# Patient Record
Sex: MALE | URBAN - METROPOLITAN AREA
[De-identification: names, ages, dates, MRNs, and addresses within clinical notes are randomized per-mention and may not be internally consistent; named-entity substitution may affect disease eponyms.]

---

## 2020-11-19 ENCOUNTER — APPOINTMENT (RX ONLY)
Dept: URBAN - METROPOLITAN AREA OTHER 8 | Facility: OTHER | Age: 65
Setting detail: DERMATOLOGY
End: 2020-11-19

## 2020-11-19 DIAGNOSIS — I83.81 VARICOSE VEINS OF LOWER EXTREMITIES WITH PAIN: ICD-10-CM

## 2020-11-19 PROBLEM — I83.811 VARICOSE VEINS OF RIGHT LOWER EXTREMITY WITH PAIN: Status: ACTIVE | Noted: 2020-11-19

## 2020-11-19 PROCEDURE — 99212 OFFICE O/P EST SF 10 MIN: CPT

## 2020-11-19 PROCEDURE — 93971 EXTREMITY STUDY: CPT

## 2020-11-19 PROCEDURE — ? DOPPLER US

## 2020-11-19 ASSESSMENT — LOCATION DETAILED DESCRIPTION DERM: LOCATION DETAILED: RIGHT PROXIMAL PRETIBIAL REGION

## 2020-11-19 ASSESSMENT — LOCATION SIMPLE DESCRIPTION DERM: LOCATION SIMPLE: RIGHT PRETIBIAL REGION

## 2020-11-19 ASSESSMENT — LOCATION ZONE DERM: LOCATION ZONE: LEG

## 2020-11-19 NOTE — PROCEDURE: DOPPLER US
Left Distal Gsv Reflux (Sec): no reflux
Use Ssv Or Lsv: SSV
Detail Level: Simple
Ultrasound Used (Optional): Terason 3000

## 2021-08-27 ENCOUNTER — OFFICE VISIT (OUTPATIENT)
Dept: URBAN - METROPOLITAN AREA CLINIC 78 | Facility: CLINIC | Age: 66
End: 2021-08-27
Payer: COMMERCIAL

## 2021-08-27 VITALS
SYSTOLIC BLOOD PRESSURE: 118 MMHG | WEIGHT: 309.8 LBS | HEIGHT: 77 IN | BODY MASS INDEX: 36.58 KG/M2 | TEMPERATURE: 97.2 F | HEART RATE: 57 BPM | DIASTOLIC BLOOD PRESSURE: 75 MMHG

## 2021-08-27 DIAGNOSIS — Z86.010 PERSONAL HISTORY OF COLONIC POLYPS: ICD-10-CM

## 2021-08-27 DIAGNOSIS — E11.9 DIABETES: ICD-10-CM

## 2021-08-27 DIAGNOSIS — K22.70 BARRETT ESOPHAGUS: ICD-10-CM

## 2021-08-27 DIAGNOSIS — F45.8 GLOBUS SENSATION: ICD-10-CM

## 2021-08-27 PROCEDURE — 99214 OFFICE O/P EST MOD 30 MIN: CPT | Performed by: INTERNAL MEDICINE

## 2021-08-27 RX ORDER — ASPIRIN 81 MG/1
TABLET, COATED ORAL
Qty: 0 | Refills: 0 | Status: ACTIVE | COMMUNITY
Start: 1900-01-01

## 2021-08-27 RX ORDER — ATORVASTATIN CALCIUM 40 MG/1
TAKE 1 TABLET (40 MG) BY ORAL ROUTE ONCE DAILY TABLET, FILM COATED ORAL 1
Qty: 0 | Refills: 0 | Status: ACTIVE | COMMUNITY
Start: 1900-01-01

## 2021-08-27 RX ORDER — MELOXICAM 15 MG/1
TAKE 1 TABLET (15 MG) BY ORAL ROUTE ONCE DAILY TABLET ORAL 1
Qty: 0 | Refills: 0 | Status: ACTIVE | COMMUNITY
Start: 1900-01-01

## 2021-08-27 RX ORDER — LISINOPRIL 2.5 MG/1
TAKE 1 TABLET (2.5 MG) BY ORAL ROUTE ONCE DAILY TABLET ORAL 1
Qty: 0 | Refills: 0 | Status: ACTIVE | COMMUNITY
Start: 1900-01-01

## 2021-08-27 RX ORDER — PROPAFENONE HYDROCHLORIDE 300 MG/1
TAKE 1 TABLET (300 MG) BY ORAL ROUTE EVERY 8 HOURS TABLET, COATED ORAL
Qty: 0 | Refills: 0 | Status: ACTIVE | COMMUNITY
Start: 1900-01-01

## 2021-08-27 RX ORDER — SPIRONOLACTONE 25 MG/1
TAKE 1 TABLET (25 MG) BY ORAL ROUTE EVERY OTHER DAY TABLET ORAL
Qty: 0 | Refills: 0 | Status: ACTIVE | COMMUNITY
Start: 1900-01-01

## 2021-08-27 RX ORDER — DAPAGLIFLOZIN 10 MG/1
TAKE 1 TABLET (10 MG) BY ORAL ROUTE ONCE DAILY IN THE MORNING TABLET, FILM COATED ORAL 1
Qty: 0 | Refills: 0 | Status: ACTIVE | COMMUNITY
Start: 1900-01-01

## 2021-08-27 RX ORDER — PANTOPRAZOLE SODIUM 40 MG
TAKE 1 TABLET (40 MG) BY ORAL ROUTE TWICE DAILY 1/2 HOUR BEFORE BREAKFAST AND 1/2 HOUR BEFORE DINNER TABLET, DELAYED RELEASE (ENTERIC COATED) ORAL 1
Qty: 60 | Refills: 2 | Status: ACTIVE | COMMUNITY
Start: 2019-07-23

## 2021-08-27 RX ORDER — PANTOPRAZOLE SODIUM 40 MG/1
TAKE 1 TABLET (40 MG) BY ORAL ROUTE ONCE DAILY TABLET, DELAYED RELEASE ORAL 1
Qty: 0 | Refills: 0 | Status: ACTIVE | COMMUNITY
Start: 1900-01-01

## 2021-08-27 RX ORDER — GABAPENTIN 300 MG/1
TAKE 1 CAPSULE (300 MG) BY ORAL ROUTE 3 TIMES PER DAY CAPSULE ORAL
Qty: 0 | Refills: 0 | Status: ACTIVE | COMMUNITY
Start: 1900-01-01

## 2021-08-27 NOTE — PHYSICAL EXAM NECK/THYROID:
Pre-Surgery Instructions:   Medication Instructions    acetaminophen (TYLENOL) 325 mg tablet Instructed patient per Anesthesia Guidelines   albuterol (2 5 mg/3 mL) 0 083 % nebulizer solution Instructed patient per Anesthesia Guidelines   albuterol (PROVENTIL HFA,VENTOLIN HFA) 90 mcg/act inhaler Instructed patient per Anesthesia Guidelines   amLODIPine (NORVASC) 5 mg tablet Instructed patient per Anesthesia Guidelines   atorvastatin (LIPITOR) 20 mg tablet Instructed patient per Anesthesia Guidelines   candesartan (ATACAND) 32 MG tablet Instructed patient per Anesthesia Guidelines   glucosamine-chondroitin 500-400 MG tablet Instructed patient per Anesthesia Guidelines   hydrochlorothiazide (HYDRODIURIL) 25 mg tablet Instructed patient per Anesthesia Guidelines   hydrocortisone (ANUSOL-HC) 2 5 % rectal cream Instructed patient per Anesthesia Guidelines   ibandronate (BONIVA) 150 MG tablet Instructed patient per Anesthesia Guidelines   MAGNESIUM PO Instructed patient per Anesthesia Guidelines   meclizine (ANTIVERT) 12 5 MG tablet Instructed patient per Anesthesia Guidelines   metFORMIN (GLUCOPHAGE) 500 mg tablet Instructed patient per Anesthesia Guidelines   mometasone-formoterol (DULERA) 200-5 MCG/ACT inhaler Instructed patient per Anesthesia Guidelines   VITAMIN E PO Instructed patient per Anesthesia Guidelines  Spoke to pt  Medication list reviewed & instructed   As of 9/16 pt to stop vit E, magnesium, and glucosamine  Instructed on tylenol only  Am DOS pt ok to take amlodipine with small sip of water  Pt to use dulera inhaler prior to arrival    Showering instructions provided by surgeon office, reviewed @ time of call   Pt states she does not have bowel prep instructions, will contact surgeon office to notify  Pt confirmed she has pre op abx prescribed by surgeon office     Reviewed 1 visitor / patient policy   Advised no alcohol 24 hour prior   Negative COVID normal appearance , without tenderness upon palpation , no deformities , trachea midline , Thyroid normal size , no thyroid nodules , no masses , no JVD , thyroid nontender screening, pending  All instructions verbally understood by patient  All questions answered  Callback number provided       ACE/ARB Med Class     Continue this medication up to the evening before surgery/procedure, but do not take the morning of the day of surgery  Diuretic Med Class     Continue this medication up to the evening before surgery/procedure, but do not take the morning of the day of surgery  Acetaminophen Med Class     Continue to take this medication on your normal schedule  If this is an oral medication and you take it in the morning, then you may take this medicine with a sip of water  Calcium Channel Blocker Med Class     Continue to take this heart medication on your normal schedule  If this is an oral medication and you take it in the morning, then you may take this medicine with a sip of water  Herbal Med Class     Stop taking this herbal medications at least one week prior to surgery/procedure  Inhalational Med Class     Continue to take these inhaler medications on your normal schedule up to and including the day of surgery  Insulin Med Class     Pre-Surgery/Procedure Instructions for Adult Patients who Take Medicine for Diabetes or to Control their Blood Sugar     Day Before Surgery/Procedure  Use the directions based on the type of medicine you take for your diabetes  1  If you are having a procedure that does not require a bowel prep:  ? Pre-Mixed Insulin (Intermediate Acting: Humalog 75/25, Humulin 70/30  Novolog 70/30, Regular Insulin)  § Take ½ your regular dose the evening before your procedure  ? Rapid/Fast Acting Insulin/Long Acting Insulin (Humalog U200, NovoLog, Apidra, Lantus, Levemir, Maxim Lyons, [place])  § Take your FULL regular dose the day before procedure  ? Oral Diabetic Medicines including Glipizide/Glimepiride/Glucotrol (sulfonylurea)  § Take your regular dose with dinner the evening before your procedure    2  If you are having a procedure (e g  Colonoscopy) that requires a bowel prep and you are allowed to have at least a clear liquid diet:  ? Pre-Mixed Insulin (Intermediate Acting: Humalog 75/25, Humulin 70/30, Novolog 70/30, Regular Insulin)  § Take ½ your regular dose the evening before your procedure  ? Rapid/Fast Acting Insulin (Humalog U200, NovoLog, Apidra, Fiasp)  § Take ½ your regular dose the evening before your procedure  ? Long Acting Insulin (Lantus, Levemir, [place])  § Take your FULL regular dose the day before procedure  ? Oral Glipizide/Glimepiride/Glucotrol (sulfonylurea)  § Take ½ your regular dose the evening before your procedure  ? Oral Diabetic Medicines that are NOT Glipizide/Glimepiride/Glucotrol  § Take your regular dose with dinner in the evening before your procedure      Day of Surgery/Procedure  · Long Acting Insulin (Lantus, Levemir, [place])  ? If you usually take your Long-Acting Insulin in the morning, take the full dose as scheduled  · With the exception of the morning Long-Acting Insulin noted above, DO NOT take ANY diabetic medicine on the day of your procedure unless you were instructed by the doctor who manages your diabetic medicines  · Continue to check your blood sugars  · If you have an insulin pump then consult with your Endocrinologist for instructions  · If you cannot see your Endocrinologist, on the day of the procedure set your insulin pump to your basal rate only  Please bring your insulin pump supplies to the hospital      This Educational material has been approved by the Patient Education Advisory Committee  Date prepared: 1/17/2018          Expiration date: 1/17/2019        Approval Number:                     Opioid Med Class     Continue to take this medication on your normal schedule  If this is an oral medication and you take it in the morning, then you may take this medicine with a sip of water  Statin Med Class     Continue to take this medication on your normal schedule    If this is an oral medication and you take it in the morning, then you may take this medicine with a sip of water  Vitamin Med Class     You may continue to take any vitamin that your surgeon has prescribed to you up to the day before surgery  If your surgeon has not specifically prescribed this vitamin or instructed you to continue then stop taking 7 days prior to surgery

## 2021-08-27 NOTE — HPI-TODAY'S VISIT:
I have followed the patient on referral from Elmer Juares DO , for a gastroenterology evaluation for abdominal pain and to discuss results of recent extensive workup.  A copy of this note will be sent to the referring physician.    The patient had initially seen me complaining that since 7/5 he has been noticing a new onset pain localized to the upper epigastric region, constant, gnawing, 9/10 in intenstiy.This pain has since resolved.  He has been noticing some new onste dysphagia to solid along the upper esophagus. He continues to have a globus sensation and the need to clear his throat. He denies much in the way of PND. He has also noted a non-specific epigastric discomfort. He has a history fo BE and has been compliant with Pantoprazole QD. He feels his heartburn is well controlled.  He has been having 1-2 soft formed BM's daily. He has not had any further abdominal pain. No blood in the stools. Appetite has been good. His weight has increased mildly lately.  He has a prior history of pancreatitis diagnosed 7 years ago, felt to be drug induced (from one of his diabetes meds). He was seen at the ER on 7/9/19 where he was told he had pancreatitis, despite having normal lipase levels. A subsequent MRI showed some non-pecific nodularity at the head/junction. An EUS was negative for pancreatic mass.  He denies tobacco or alcohol use. He has been limiting his use of Meloxicam for back pain. His DM is well controlled.   He recently underwent R knee replacement and had been on narcotics for pain control which triggered mild constipation.   He has A fib, on [facility], followed by Dr. Ocampo.   There is no FH of GI malignancies.   Summary of prior workup: - Labs on 10/8/19 revealed a CA 19-9 of 29 ( normal) and an IgG4 of 12.8. - EUS by Dr. Loomis on 10/8/19 showed diffuse echogenicity throughout the pancreas consistent with fatty infiltration/pancreatic steatosis. No PD dilation or suspicious pancreatic lesions. No pancreas divisum. Normal ampulla. Normal common bile duct. A repeat MRI of the pancreas was recommended in 3-6 months. - MRI of the abdomen on 7/29/19 showed subtle increased T2 signal surrounding the pancreas suggestive of mild acute inflammation. Significant glandular pancreatic atrophy suggestive of chronic pancreatitis.. Nodular appearance of the head & junction likely reflecting residual pancreatic issue with mass felt less likely. - EGD and colonoscopy by me on 8/20/19 revealed Barretts esoph extending from 42-45cm, biopsies were negative for dysplasia. There was mild antral erythema, and a normal duodenum. Biopsies were negative for H pylori or celiac sprue. On colonoscopy there was an 8 mm submucosal lipoma in the cecum, 7 mm intramucosal lymphoid aggregate in the HF and a diminutive benign rectal polyp. The terminal ileum was normal. Nonbleeding internal hemorrhoids were noted on retroflexion. - GI PCR panel on 7/24/19 was negative. - Labs on 7/12/19 showed a Gluc 98, BUN 16, Cr 0.87, normal electrolytes. TP 7.1, Alb 4.1, AP 89, TB 0.8, AST 12, ALT 12. Amylase 19, lipase 28. TSH 1.92. Free T4 1.0. WBC 6.3, Hb 13.1 (normal values 13.2-17.1), MCV 87, plts 253. PSA 0.2. HbA1c 7.2%. - CT/A/P with iv contrast showed edema about the uncinate process of the pancreas, normal body and tail. Isolated enlarger R inguinal LN. Fatty pancreas. Liver, spleen and adrenal glands normal. Normal enhancing kidneys. No inflammatory changes noted involving the bowel. No retroperitoneal mass.  - E/C in 2011 showed BE and a precancerous colon polyp.   - EGD by Dr. Reginald Alvarado on 7/6/09 showed a long segment non-dysplastic Barretts extending from 40 to 45 cm, normal stomach and duodenum.

## 2021-10-27 ENCOUNTER — OFFICE VISIT (OUTPATIENT)
Dept: URBAN - METROPOLITAN AREA SURGERY CENTER 15 | Facility: SURGERY CENTER | Age: 66
End: 2021-10-27
Payer: COMMERCIAL

## 2021-10-27 DIAGNOSIS — K31.7 BENIGN GASTRIC POLYP: ICD-10-CM

## 2021-10-27 DIAGNOSIS — K22.70 BARRETT ESOPHAGUS: ICD-10-CM

## 2021-10-27 PROCEDURE — G8907 PT DOC NO EVENTS ON DISCHARG: HCPCS | Performed by: INTERNAL MEDICINE

## 2021-10-27 PROCEDURE — 43239 EGD BIOPSY SINGLE/MULTIPLE: CPT | Performed by: INTERNAL MEDICINE

## 2022-01-12 ENCOUNTER — WEB ENCOUNTER (OUTPATIENT)
Dept: URBAN - METROPOLITAN AREA CLINIC 78 | Facility: CLINIC | Age: 67
End: 2022-01-12

## 2022-01-18 ENCOUNTER — OFFICE VISIT (OUTPATIENT)
Dept: URBAN - METROPOLITAN AREA CLINIC 78 | Facility: CLINIC | Age: 67
End: 2022-01-18
Payer: COMMERCIAL

## 2022-01-18 VITALS
TEMPERATURE: 97.4 F | HEART RATE: 96 BPM | BODY MASS INDEX: 37.19 KG/M2 | WEIGHT: 315 LBS | SYSTOLIC BLOOD PRESSURE: 146 MMHG | HEIGHT: 77 IN | DIASTOLIC BLOOD PRESSURE: 85 MMHG

## 2022-01-18 DIAGNOSIS — R13.10 DYSPHAGIA: ICD-10-CM

## 2022-01-18 DIAGNOSIS — K22.70 BARRETT ESOPHAGUS: ICD-10-CM

## 2022-01-18 DIAGNOSIS — R10.13 EPIGASTRIC PAIN: ICD-10-CM

## 2022-01-18 DIAGNOSIS — I48.91 UNSPECIFIED ATRIAL FIBRILLATION: ICD-10-CM

## 2022-01-18 DIAGNOSIS — E11.9 DIABETES: ICD-10-CM

## 2022-01-18 DIAGNOSIS — F45.8 GLOBUS SENSATION: ICD-10-CM

## 2022-01-18 DIAGNOSIS — R16.0 HEPATOMEGALY: ICD-10-CM

## 2022-01-18 DIAGNOSIS — R19.7 DIARRHEA, UNSPECIFIED TYPE: ICD-10-CM

## 2022-01-18 DIAGNOSIS — K86.89 FATTY PANCREAS: ICD-10-CM

## 2022-01-18 DIAGNOSIS — R11.2 NAUSEA WITH VOMITING, UNSPECIFIED: ICD-10-CM

## 2022-01-18 PROBLEM — 49436004 ATRIAL FIBRILLATION: Status: ACTIVE | Noted: 2021-08-27

## 2022-01-18 PROBLEM — 302914006 BARRETT ESOPHAGUS: Status: ACTIVE | Noted: 2021-08-27

## 2022-01-18 PROBLEM — 428283002: Status: ACTIVE | Noted: 2021-08-27

## 2022-01-18 PROBLEM — 267103008 GLOBUS SENSATION: Status: ACTIVE | Noted: 2021-08-27

## 2022-01-18 PROBLEM — 40739000 DYSPHAGIA: Status: ACTIVE | Noted: 2021-08-27

## 2022-01-18 PROCEDURE — 99214 OFFICE O/P EST MOD 30 MIN: CPT | Performed by: INTERNAL MEDICINE

## 2022-01-18 RX ORDER — DAPAGLIFLOZIN 10 MG/1
TAKE 1 TABLET (10 MG) BY ORAL ROUTE ONCE DAILY IN THE MORNING TABLET, FILM COATED ORAL 1
Qty: 0 | Refills: 0 | Status: ON HOLD | COMMUNITY
Start: 1900-01-01

## 2022-01-18 RX ORDER — INSULIN DEGLUDEC INJECTION 100 U/ML
AS DIRECTED INJECTION, SOLUTION SUBCUTANEOUS
Status: ACTIVE | COMMUNITY

## 2022-01-18 RX ORDER — ATORVASTATIN CALCIUM 40 MG/1
TAKE 1 TABLET (40 MG) BY ORAL ROUTE ONCE DAILY TABLET, FILM COATED ORAL 1
Qty: 0 | Refills: 0 | Status: ACTIVE | COMMUNITY
Start: 1900-01-01

## 2022-01-18 RX ORDER — LISINOPRIL 2.5 MG/1
TAKE 1 TABLET (2.5 MG) BY ORAL ROUTE ONCE DAILY TABLET ORAL 1
Qty: 0 | Refills: 0 | Status: ACTIVE | COMMUNITY
Start: 1900-01-01

## 2022-01-18 RX ORDER — PROPAFENONE HYDROCHLORIDE 300 MG/1
TAKE 1 TABLET (300 MG) BY ORAL ROUTE EVERY 8 HOURS TABLET, COATED ORAL
Qty: 0 | Refills: 0 | Status: ON HOLD | COMMUNITY
Start: 1900-01-01

## 2022-01-18 RX ORDER — PANTOPRAZOLE SODIUM 40 MG
TAKE 1 TABLET (40 MG) BY ORAL ROUTE TWICE DAILY 1/2 HOUR BEFORE BREAKFAST AND 1/2 HOUR BEFORE DINNER TABLET, DELAYED RELEASE (ENTERIC COATED) ORAL 1
Qty: 60 | Refills: 2 | Status: ON HOLD | COMMUNITY
Start: 2019-07-23

## 2022-01-18 RX ORDER — MELOXICAM 15 MG/1
TAKE 1 TABLET (15 MG) BY ORAL ROUTE ONCE DAILY TABLET ORAL 1
Qty: 0 | Refills: 0 | Status: ON HOLD | COMMUNITY
Start: 1900-01-01

## 2022-01-18 RX ORDER — INSULIN ASPART 100 [IU]/ML
AS DIRECTED INJECTION, SOLUTION INTRAVENOUS; SUBCUTANEOUS
Status: ACTIVE | COMMUNITY

## 2022-01-18 RX ORDER — ASPIRIN 81 MG/1
TABLET, COATED ORAL
Qty: 0 | Refills: 0 | Status: ON HOLD | COMMUNITY
Start: 1900-01-01

## 2022-01-18 RX ORDER — PIOGLITAZONE 30 MG/1
1 TABLET TABLET ORAL ONCE A DAY
Status: ACTIVE | COMMUNITY

## 2022-01-18 RX ORDER — EMPAGLIFLOZIN 25 MG/1
1 TABLET TABLET, FILM COATED ORAL ONCE A DAY
Status: ACTIVE | COMMUNITY

## 2022-01-18 RX ORDER — PANTOPRAZOLE SODIUM 40 MG/1
TAKE 1 TABLET (40 MG) BY ORAL ROUTE ONCE DAILY TABLET, DELAYED RELEASE ORAL 1
Qty: 0 | Refills: 0 | Status: ACTIVE | COMMUNITY
Start: 1900-01-01

## 2022-01-18 RX ORDER — TRAVOPROST OPHTHALMIC SOLUTION, 0.004% 0.04 MG/ML
1 DROP INTO AFFECTED EYE IN THE EVENING SOLUTION/ DROPS OPHTHALMIC ONCE A DAY
Status: ACTIVE | COMMUNITY

## 2022-01-18 RX ORDER — SPIRONOLACTONE 25 MG/1
TAKE 1 TABLET (25 MG) BY ORAL ROUTE EVERY OTHER DAY TABLET ORAL
Qty: 0 | Refills: 0 | Status: ON HOLD | COMMUNITY
Start: 1900-01-01

## 2022-01-18 RX ORDER — FEXOFENADINE HYDROCHLORIDE AND PSEUDOEPHEDRINE HYDROCHLORIDE 180; 240 MG/1; MG/1
1 TABLET TABLET, FILM COATED, EXTENDED RELEASE ORAL ONCE A DAY
Qty: 30 | Refills: 2 | OUTPATIENT
Start: 2022-01-18 | End: 2022-04-18

## 2022-01-18 RX ORDER — ONDANSETRON HYDROCHLORIDE 8 MG/1
1 TABLET TABLET, FILM COATED ORAL
Qty: 40 | Refills: 1 | OUTPATIENT
Start: 2022-01-18

## 2022-01-18 RX ORDER — GABAPENTIN 300 MG/1
TAKE 1 CAPSULE (300 MG) BY ORAL ROUTE 3 TIMES PER DAY CAPSULE ORAL
Qty: 0 | Refills: 0 | Status: ACTIVE | COMMUNITY
Start: 1900-01-01

## 2022-01-18 RX ORDER — SUCRALFATE 1 G/1
1 TABLET TABLET ORAL
Qty: 60 | Refills: 2 | OUTPATIENT
Start: 2022-01-18 | End: 2022-04-18

## 2022-01-18 NOTE — HPI-TODAY'S VISIT:
I have followed the patient on referral from Elmer Juares DO. A copy of this note will be sent to the referring physician.    He has a history fo BE and has been compliant with Pantoprazole. He feels his heartburn is well controlled. He continues to have a globus sensation and the need to clear his throat. He denies much in the way of PND.  He has a prior history of pancreatitis diagnosed 7 years ago, felt to be drug induced (from one of his diabetes meds). He was seen at the ER on 7/9/19 where he was told he had pancreatitis, despite having normal lipase levels. A subsequent MRI showed some non-pecific nodularity at the head/junction. An EUS was negative for pancreatic mass.  *Since his last visit he was diagnosed with COVID pneumonia and was hospitalized, at which time he received Remdesvir and steroids. He still has coughing spells and followed up with Pulm on 1/14. A repeat CT chest was ordered to monitor his pneumonia.   He has also had multiple GI complaints. He reports some nausea and vomting. He has had some diarhrea. He describes having 2 BM's daily- sometimes runny, other times semi-soft. They have been a dark brown color.  he has not felt much of an appetite. He has had an increased burning sensation postprandially in the epigastric region which lasts hours.   He has been compliant with using Pantoprazole BID. Today we reviewed the resutls of his recent EGD and path.   He denies tobacco or alcohol use. He has been limiting his use of Meloxicam for back pain. His DM is well controlled.   He has A fib, on Eliquis, followed by Dr. Ocampo.   There is no FH of GI malignancies.   Summary of prior workup: - CT abdomen and pelvis with contrast on 1/7/2022: Patchy interstitial opacities in the posterior lung bases.  Differential includes developing pneumonia versus segmental atelectasis.  Hepatomegaly.  Fatty infiltration of the pancreas. - EGD by me on 10/27/21: Normal esoph (distal esoph bxs neg for reflux), C2M4 (40-44 from the incisors) non dysplastic BE, fundic gland polyps, antra; erythema, normal duodenum/papilla. - Labs on 10/8/19 revealed a CA 19-9 of 29 ( normal) and an IgG4 of 12.8. - EUS by Dr. Loomis on 10/8/19 showed diffuse echogenicity throughout the pancreas consistent with fatty infiltration/pancreatic steatosis. No PD dilation or suspicious pancreatic lesions. No pancreas divisum. Normal ampulla. Normal common bile duct. A repeat MRI of the pancreas was recommended in 3-6 months. - MRI of the abdomen on 7/29/19 showed subtle increased T2 signal surrounding the pancreas suggestive of mild acute inflammation. Significant glandular pancreatic atrophy suggestive of chronic pancreatitis.. Nodular appearance of the head & junction likely reflecting residual pancreatic issue with mass felt less likely. - EGD and colonoscopy by me on 8/20/19 revealed Barretts esoph extending from 42-45cm, biopsies were negative for dysplasia. There was mild antral erythema, and a normal duodenum. Biopsies were negative for H pylori or celiac sprue. On colonoscopy there was an 8 mm submucosal lipoma in the cecum, 7 mm intramucosal lymphoid aggregate in the HF and a diminutive benign rectal polyp. The terminal ileum was normal. Nonbleeding internal hemorrhoids were noted on retroflexion. - GI PCR panel on 7/24/19 was negative. - Labs on 7/12/19 showed a Gluc 98, BUN 16, Cr 0.87, normal electrolytes. TP 7.1, Alb 4.1, AP 89, TB 0.8, AST 12, ALT 12. Amylase 19, lipase 28. TSH 1.92. Free T4 1.0. WBC 6.3, Hb 13.1 (normal values 13.2-17.1), MCV 87, plts 253. PSA 0.2. HbA1c 7.2%. - CT/A/P with iv contrast showed edema about the uncinate process of the pancreas, normal body and tail. Isolated enlarger R inguinal LN. Fatty pancreas. Liver, spleen and adrenal glands normal. Normal enhancing kidneys. No inflammatory changes noted involving the bowel. No retroperitoneal mass.  - E/C in 2011 showed BE and a precancerous colon polyp.   - EGD by Dr. Reginald Alvarado on 7/6/09 showed a long segment non-dysplastic Barretts extending from 40 to 45 cm, normal stomach and duodenum.

## 2022-01-19 ENCOUNTER — LAB OUTSIDE AN ENCOUNTER (OUTPATIENT)
Dept: URBAN - METROPOLITAN AREA CLINIC 78 | Facility: CLINIC | Age: 67
End: 2022-01-19

## 2022-01-22 LAB — GASTROINTESTINAL PATHOGEN: (no result)

## 2022-01-31 ENCOUNTER — OFFICE VISIT (OUTPATIENT)
Dept: URBAN - METROPOLITAN AREA CLINIC 78 | Facility: CLINIC | Age: 67
End: 2022-01-31

## 2023-12-04 ENCOUNTER — CLAIMS CREATED FROM THE CLAIM WINDOW (OUTPATIENT)
Dept: URBAN - METROPOLITAN AREA CLINIC 78 | Facility: CLINIC | Age: 68
End: 2023-12-04

## 2023-12-04 ENCOUNTER — OFFICE VISIT (OUTPATIENT)
Dept: URBAN - METROPOLITAN AREA CLINIC 78 | Facility: CLINIC | Age: 68
End: 2023-12-04

## 2023-12-04 ENCOUNTER — DASHBOARD ENCOUNTERS (OUTPATIENT)
Age: 68
End: 2023-12-04

## 2023-12-04 VITALS
HEART RATE: 102 BPM | SYSTOLIC BLOOD PRESSURE: 109 MMHG | HEIGHT: 77 IN | DIASTOLIC BLOOD PRESSURE: 73 MMHG | WEIGHT: 315 LBS | RESPIRATION RATE: 15 BRPM | TEMPERATURE: 98.2 F | BODY MASS INDEX: 37.19 KG/M2

## 2023-12-04 DIAGNOSIS — G47.33 OSA ON CPAP: ICD-10-CM

## 2023-12-04 DIAGNOSIS — R19.7 DIARRHEA, UNSPECIFIED TYPE: ICD-10-CM

## 2023-12-04 DIAGNOSIS — E11.9 DIABETES: ICD-10-CM

## 2023-12-04 DIAGNOSIS — R11.2 NAUSEA WITH VOMITING, UNSPECIFIED: ICD-10-CM

## 2023-12-04 DIAGNOSIS — K59.01 CONSTIPATION: ICD-10-CM

## 2023-12-04 DIAGNOSIS — R05 COUGH: ICD-10-CM

## 2023-12-04 DIAGNOSIS — K22.70 BARRETT ESOPHAGUS: ICD-10-CM

## 2023-12-04 DIAGNOSIS — R13.10 DYSPHAGIA: ICD-10-CM

## 2023-12-04 DIAGNOSIS — F41.9 ANXIETY: ICD-10-CM

## 2023-12-04 DIAGNOSIS — F43.10 PTSD (POST-TRAUMATIC STRESS DISORDER): ICD-10-CM

## 2023-12-04 DIAGNOSIS — I48.91 UNSPECIFIED ATRIAL FIBRILLATION: ICD-10-CM

## 2023-12-04 DIAGNOSIS — R16.0 HEPATOMEGALY: ICD-10-CM

## 2023-12-04 DIAGNOSIS — E66.9 OBESITY (BMI 30-39.9): ICD-10-CM

## 2023-12-04 DIAGNOSIS — Z86.010 PERSONAL HISTORY OF COLONIC POLYPS: ICD-10-CM

## 2023-12-04 DIAGNOSIS — F45.8 GLOBUS SENSATION: ICD-10-CM

## 2023-12-04 DIAGNOSIS — K86.89 FATTY PANCREAS: ICD-10-CM

## 2023-12-04 PROBLEM — 47505003: Status: ACTIVE | Noted: 2023-12-04

## 2023-12-04 PROBLEM — 48694002: Status: ACTIVE | Noted: 2023-12-04

## 2023-12-04 PROBLEM — 78275009: Status: ACTIVE | Noted: 2023-12-04

## 2023-12-04 RX ORDER — ONDANSETRON HYDROCHLORIDE 8 MG/1
1 TABLET TABLET, FILM COATED ORAL
Qty: 40 | Refills: 1 | Status: ACTIVE | COMMUNITY
Start: 2022-01-18

## 2023-12-04 RX ORDER — EMPAGLIFLOZIN 25 MG/1
1 TABLET TABLET, FILM COATED ORAL ONCE A DAY
Status: ACTIVE | COMMUNITY

## 2023-12-04 RX ORDER — PANTOPRAZOLE SODIUM 40 MG/1
TAKE 1 TABLET (40 MG) BY ORAL ROUTE ONCE DAILY TABLET, DELAYED RELEASE ORAL 1
Qty: 0 | Refills: 0 | Status: ACTIVE | COMMUNITY
Start: 1900-01-01

## 2023-12-04 RX ORDER — ASPIRIN 81 MG/1
TABLET, COATED ORAL
Qty: 0 | Refills: 0 | Status: ON HOLD | COMMUNITY
Start: 1900-01-01

## 2023-12-04 RX ORDER — PANTOPRAZOLE SODIUM 40 MG/1
TAKE 1 TABLET (40 MG) BY ORAL ROUTE ONCE DAILY TABLET, DELAYED RELEASE ORAL ONCE A DAY
Qty: 90 | Refills: 3

## 2023-12-04 RX ORDER — INSULIN DEGLUDEC INJECTION 100 U/ML
AS DIRECTED INJECTION, SOLUTION SUBCUTANEOUS
Status: ACTIVE | COMMUNITY

## 2023-12-04 RX ORDER — LISINOPRIL 2.5 MG/1
TAKE 1 TABLET (2.5 MG) BY ORAL ROUTE ONCE DAILY TABLET ORAL 1
Qty: 0 | Refills: 0 | Status: ACTIVE | COMMUNITY
Start: 1900-01-01

## 2023-12-04 RX ORDER — POLYETHYLENE GLYCOL 3350, SODIUM SULFATE, POTASSIUM CHLORIDE, MAGNESIUM SULFATE, AND SODIUM CHLORIDE FOR ORAL SOLUTION 178.7-7.3G
AS DIRECTED KIT ORAL
OUTPATIENT
Start: 2023-12-04

## 2023-12-04 RX ORDER — TRAVOPROST OPHTHALMIC SOLUTION, 0.004% 0.04 MG/ML
1 DROP INTO AFFECTED EYE IN THE EVENING SOLUTION/ DROPS OPHTHALMIC ONCE A DAY
Status: ACTIVE | COMMUNITY

## 2023-12-04 RX ORDER — GABAPENTIN 300 MG/1
TAKE 1 CAPSULE (300 MG) BY ORAL ROUTE 3 TIMES PER DAY CAPSULE ORAL
Qty: 0 | Refills: 0 | Status: ACTIVE | COMMUNITY
Start: 1900-01-01

## 2023-12-04 RX ORDER — PANTOPRAZOLE SODIUM 40 MG
TAKE 1 TABLET (40 MG) BY ORAL ROUTE TWICE DAILY 1/2 HOUR BEFORE BREAKFAST AND 1/2 HOUR BEFORE DINNER TABLET, DELAYED RELEASE (ENTERIC COATED) ORAL 1
Qty: 60 | Refills: 2 | Status: ON HOLD | COMMUNITY
Start: 2019-07-23

## 2023-12-04 RX ORDER — ATORVASTATIN CALCIUM 40 MG/1
TAKE 1 TABLET (40 MG) BY ORAL ROUTE ONCE DAILY TABLET, FILM COATED ORAL 1
Qty: 0 | Refills: 0 | Status: ACTIVE | COMMUNITY
Start: 1900-01-01

## 2023-12-04 RX ORDER — DAPAGLIFLOZIN 10 MG/1
TAKE 1 TABLET (10 MG) BY ORAL ROUTE ONCE DAILY IN THE MORNING TABLET, FILM COATED ORAL 1
Qty: 0 | Refills: 0 | Status: ON HOLD | COMMUNITY
Start: 1900-01-01

## 2023-12-04 RX ORDER — ONDANSETRON HYDROCHLORIDE 8 MG/1
1 TABLET TABLET, FILM COATED ORAL
Qty: 40 | Refills: 1 | OUTPATIENT

## 2023-12-04 RX ORDER — PIOGLITAZONE 30 MG/1
1 TABLET TABLET ORAL ONCE A DAY
Status: ACTIVE | COMMUNITY

## 2023-12-04 RX ORDER — INSULIN ASPART 100 [IU]/ML
AS DIRECTED INJECTION, SOLUTION INTRAVENOUS; SUBCUTANEOUS
Status: ACTIVE | COMMUNITY

## 2023-12-04 RX ORDER — SPIRONOLACTONE 25 MG/1
TAKE 1 TABLET (25 MG) BY ORAL ROUTE EVERY OTHER DAY TABLET ORAL
Qty: 0 | Refills: 0 | Status: ON HOLD | COMMUNITY
Start: 1900-01-01

## 2023-12-04 RX ORDER — MELOXICAM 15 MG/1
TAKE 1 TABLET (15 MG) BY ORAL ROUTE ONCE DAILY TABLET ORAL 1
Qty: 0 | Refills: 0 | Status: ON HOLD | COMMUNITY
Start: 1900-01-01

## 2023-12-04 RX ORDER — PROPAFENONE HYDROCHLORIDE 300 MG/1
TAKE 1 TABLET (300 MG) BY ORAL ROUTE EVERY 8 HOURS TABLET, COATED ORAL
Qty: 0 | Refills: 0 | Status: ON HOLD | COMMUNITY
Start: 1900-01-01

## 2023-12-04 NOTE — HPI-TODAY'S VISIT:
I have followed the patient on referral from Elmer Juares DO. A copy of this note will be sent to the referring physician.    He has a history fo BE and has been compliant with Pantoprazole. He feels his heartburn is well controlled.  He has a prior history of pancreatitis diagnosed several years ago, felt to be drug induced (from one of his diabetes meds). He was seen at the ER on 7/9/19 where he was told he had pancreatitis, despite having normal lipase levels. A subsequent MRI showed some non-pecific nodularity at the head/junction. An EUS was negative for pancreatic mass.  He has also had multiple GI complaints. He reports some nausea and vomting. He has had some diarhrea. He describes having 2 BM's daily- sometimes runny, other times semi-soft. They have been a dark brown color.    He has been compliant with using Pantoprazole QD.  He denies tobacco or alcohol use. He has been limiting his use of Meloxicam for back pain. His DM is well controlled.   He has A fib, on Xarelto, followed by Dr. Allie Hester.   He is on Mounjaro as ordered by his endocrinologist. He is on a low dose. He has lost 3 lbs. He is more active lately.   There is no FH of GI malignancies.   Summary of prior workup: - GI PCR panel negative on 1/19/22 - CT abdomen and pelvis with contrast on 1/7/2022: Patchy interstitial opacities in the posterior lung bases.  Differential includes developing pneumonia versus segmental atelectasis.  Hepatomegaly.  Fatty infiltration of the pancreas. - EGD by me on 10/27/21: Normal esoph (distal esoph bxs neg for reflux), C2M4 (40-44 from the incisors) non dysplastic BE, fundic gland polyps, antra; erythema, normal duodenum/papilla. - Labs on 10/8/19 revealed a CA 19-9 of 29 ( normal) and an IgG4 of 12.8. - EUS by Dr. Loomis on 10/8/19 showed diffuse echogenicity throughout the pancreas consistent with fatty infiltration/pancreatic steatosis. No PD dilation or suspicious pancreatic lesions. No pancreas divisum. Normal ampulla. Normal common bile duct. A repeat MRI of the pancreas was recommended in 3-6 months. - MRI of the abdomen on 7/29/19 showed subtle increased T2 signal surrounding the pancreas suggestive of mild acute inflammation. Significant glandular pancreatic atrophy suggestive of chronic pancreatitis.. Nodular appearance of the head & junction likely reflecting residual pancreatic issue with mass felt less likely. - EGD and colonoscopy by me on 8/20/19 revealed Barretts esoph extending from 42-45cm, biopsies were negative for dysplasia. There was mild antral erythema, and a normal duodenum. Biopsies were negative for H pylori or celiac sprue. On colonoscopy there was an 8 mm submucosal lipoma in the cecum, 7 mm intramucosal lymphoid aggregate in the HF and a diminutive benign rectal polyp. The terminal ileum was normal. Nonbleeding internal hemorrhoids were noted on retroflexion. - GI PCR panel on 7/24/19 was negative. - Labs on 7/12/19 showed a Gluc 98, BUN 16, Cr 0.87, normal electrolytes. TP 7.1, Alb 4.1, AP 89, TB 0.8, AST 12, ALT 12. Amylase 19, lipase 28. TSH 1.92. Free T4 1.0. WBC 6.3, Hb 13.1 (normal values 13.2-17.1), MCV 87, plts 253. PSA 0.2. HbA1c 7.2%. - CT/A/P with iv contrast showed edema about the uncinate process of the pancreas, normal body and tail. Isolated enlarger R inguinal LN. Fatty pancreas. Liver, spleen and adrenal glands normal. Normal enhancing kidneys. No inflammatory changes noted involving the bowel. No retroperitoneal mass.  - E/C in 2011 showed BE and a precancerous colon polyp.   - EGD by Dr. Reginald Alvarado on 7/6/09 showed a long segment non-dysplastic Barretts extending from 40 to 45 cm, normal stomach and duodenum.

## 2023-12-08 ENCOUNTER — TELEPHONE ENCOUNTER (OUTPATIENT)
Dept: URBAN - METROPOLITAN AREA CLINIC 78 | Facility: CLINIC | Age: 68
End: 2023-12-08

## 2023-12-08 RX ORDER — SODIUM, POTASSIUM,MAG SULFATES 17.5-3.13G
177ML DOSE 1, 177ML DOSE 2 SOLUTION, RECONSTITUTED, ORAL ORAL AS DIRECTED
Qty: 354 MILLILITER | Refills: 0 | OUTPATIENT
Start: 2023-12-08 | End: 2023-12-10

## 2024-01-08 ENCOUNTER — APPOINTMENT (RX ONLY)
Dept: URBAN - NONMETROPOLITAN AREA OTHER 1 | Facility: OTHER | Age: 69
Setting detail: DERMATOLOGY
End: 2024-01-08

## 2024-01-08 DIAGNOSIS — D18.0 HEMANGIOMA: ICD-10-CM

## 2024-01-08 DIAGNOSIS — L72.0 EPIDERMAL CYST: ICD-10-CM

## 2024-01-08 DIAGNOSIS — L81.4 OTHER MELANIN HYPERPIGMENTATION: ICD-10-CM

## 2024-01-08 DIAGNOSIS — L72.8 OTHER FOLLICULAR CYSTS OF THE SKIN AND SUBCUTANEOUS TISSUE: ICD-10-CM

## 2024-01-08 PROBLEM — D18.01 HEMANGIOMA OF SKIN AND SUBCUTANEOUS TISSUE: Status: ACTIVE | Noted: 2024-01-08

## 2024-01-08 PROCEDURE — ? COUNSELING

## 2024-01-08 PROCEDURE — 99202 OFFICE O/P NEW SF 15 MIN: CPT

## 2024-01-08 ASSESSMENT — LOCATION DETAILED DESCRIPTION DERM
LOCATION DETAILED: INFERIOR THORACIC SPINE
LOCATION DETAILED: RIGHT SUPERIOR POSTERIOR NECK
LOCATION DETAILED: LEFT POSTERIOR SHOULDER
LOCATION DETAILED: RIGHT SUPERIOR POSTERIOR NECK
LOCATION DETAILED: LEFT SUPERIOR LATERAL UPPER BACK
LOCATION DETAILED: RIGHT POSTERIOR SHOULDER

## 2024-01-08 ASSESSMENT — LOCATION SIMPLE DESCRIPTION DERM
LOCATION SIMPLE: LEFT SHOULDER
LOCATION SIMPLE: POSTERIOR NECK
LOCATION SIMPLE: UPPER BACK
LOCATION SIMPLE: POSTERIOR NECK
LOCATION SIMPLE: RIGHT SHOULDER
LOCATION SIMPLE: LEFT UPPER BACK

## 2024-01-08 ASSESSMENT — LOCATION ZONE DERM
LOCATION ZONE: NECK
LOCATION ZONE: TRUNK
LOCATION ZONE: ARM
LOCATION ZONE: NECK

## 2024-01-15 ENCOUNTER — APPOINTMENT (RX ONLY)
Dept: URBAN - NONMETROPOLITAN AREA OTHER 1 | Facility: OTHER | Age: 69
Setting detail: DERMATOLOGY
End: 2024-01-15

## 2024-01-15 DIAGNOSIS — L72.8 OTHER FOLLICULAR CYSTS OF THE SKIN AND SUBCUTANEOUS TISSUE: ICD-10-CM

## 2024-01-15 PROCEDURE — 12042 INTMD RPR N-HF/GENIT2.6-7.5: CPT

## 2024-01-15 PROCEDURE — ? EXCISION

## 2024-01-15 PROCEDURE — 11423 EXC H-F-NK-SP B9+MARG 2.1-3: CPT

## 2024-01-15 ASSESSMENT — LOCATION DETAILED DESCRIPTION DERM: LOCATION DETAILED: RIGHT SUPERIOR POSTERIOR NECK

## 2024-01-15 ASSESSMENT — LOCATION SIMPLE DESCRIPTION DERM: LOCATION SIMPLE: POSTERIOR NECK

## 2024-01-15 ASSESSMENT — LOCATION ZONE DERM: LOCATION ZONE: NECK

## 2024-01-15 NOTE — PROCEDURE: EXCISION

## 2024-01-23 ENCOUNTER — OFFICE VISIT (OUTPATIENT)
Dept: URBAN - METROPOLITAN AREA MEDICAL CENTER 10 | Facility: MEDICAL CENTER | Age: 69
End: 2024-01-23
Payer: COMMERCIAL

## 2024-01-23 DIAGNOSIS — Z86.010 ADENOMAS PERSONAL HISTORY OF COLONIC POLYPS: ICD-10-CM

## 2024-01-23 DIAGNOSIS — K31.7 BENIGN GASTRIC POLYP: ICD-10-CM

## 2024-01-23 DIAGNOSIS — K22.70 BARRETT ESOPHAGUS: ICD-10-CM

## 2024-01-23 PROCEDURE — 43239 EGD BIOPSY SINGLE/MULTIPLE: CPT | Performed by: INTERNAL MEDICINE

## 2024-01-23 PROCEDURE — G0105 COLORECTAL SCRN; HI RISK IND: HCPCS | Performed by: INTERNAL MEDICINE

## 2024-01-23 RX ORDER — EMPAGLIFLOZIN 25 MG/1
1 TABLET TABLET, FILM COATED ORAL ONCE A DAY
Status: ACTIVE | COMMUNITY

## 2024-01-23 RX ORDER — PROPAFENONE HYDROCHLORIDE 300 MG/1
TAKE 1 TABLET (300 MG) BY ORAL ROUTE EVERY 8 HOURS TABLET, COATED ORAL
Qty: 0 | Refills: 0 | Status: ON HOLD | COMMUNITY
Start: 1900-01-01

## 2024-01-23 RX ORDER — INSULIN ASPART 100 [IU]/ML
AS DIRECTED INJECTION, SOLUTION INTRAVENOUS; SUBCUTANEOUS
Status: ACTIVE | COMMUNITY

## 2024-01-23 RX ORDER — MELOXICAM 15 MG/1
TAKE 1 TABLET (15 MG) BY ORAL ROUTE ONCE DAILY TABLET ORAL 1
Qty: 0 | Refills: 0 | Status: ON HOLD | COMMUNITY
Start: 1900-01-01

## 2024-01-23 RX ORDER — ATORVASTATIN CALCIUM 40 MG/1
TAKE 1 TABLET (40 MG) BY ORAL ROUTE ONCE DAILY TABLET, FILM COATED ORAL 1
Qty: 0 | Refills: 0 | Status: ACTIVE | COMMUNITY
Start: 1900-01-01

## 2024-01-23 RX ORDER — POLYETHYLENE GLYCOL 3350, SODIUM SULFATE, POTASSIUM CHLORIDE, MAGNESIUM SULFATE, AND SODIUM CHLORIDE FOR ORAL SOLUTION 178.7-7.3G
AS DIRECTED KIT ORAL
Status: ACTIVE | COMMUNITY
Start: 2023-12-04

## 2024-01-23 RX ORDER — PANTOPRAZOLE SODIUM 40 MG
TAKE 1 TABLET (40 MG) BY ORAL ROUTE TWICE DAILY 1/2 HOUR BEFORE BREAKFAST AND 1/2 HOUR BEFORE DINNER TABLET, DELAYED RELEASE (ENTERIC COATED) ORAL 1
Qty: 60 | Refills: 2 | Status: ON HOLD | COMMUNITY
Start: 2019-07-23

## 2024-01-23 RX ORDER — LISINOPRIL 2.5 MG/1
TAKE 1 TABLET (2.5 MG) BY ORAL ROUTE ONCE DAILY TABLET ORAL 1
Qty: 0 | Refills: 0 | Status: ACTIVE | COMMUNITY
Start: 1900-01-01

## 2024-01-23 RX ORDER — DAPAGLIFLOZIN 10 MG/1
TAKE 1 TABLET (10 MG) BY ORAL ROUTE ONCE DAILY IN THE MORNING TABLET, FILM COATED ORAL 1
Qty: 0 | Refills: 0 | Status: ON HOLD | COMMUNITY
Start: 1900-01-01

## 2024-01-23 RX ORDER — PANTOPRAZOLE SODIUM 40 MG/1
TAKE 1 TABLET (40 MG) BY ORAL ROUTE ONCE DAILY TABLET, DELAYED RELEASE ORAL ONCE A DAY
Qty: 90 | Refills: 3 | Status: ACTIVE | COMMUNITY

## 2024-01-23 RX ORDER — ONDANSETRON HYDROCHLORIDE 8 MG/1
1 TABLET TABLET, FILM COATED ORAL
Qty: 40 | Refills: 1 | Status: ACTIVE | COMMUNITY

## 2024-01-23 RX ORDER — ASPIRIN 81 MG/1
TABLET, COATED ORAL
Qty: 0 | Refills: 0 | Status: ON HOLD | COMMUNITY
Start: 1900-01-01

## 2024-01-23 RX ORDER — GABAPENTIN 300 MG/1
TAKE 1 CAPSULE (300 MG) BY ORAL ROUTE 3 TIMES PER DAY CAPSULE ORAL
Qty: 0 | Refills: 0 | Status: ACTIVE | COMMUNITY
Start: 1900-01-01

## 2024-01-23 RX ORDER — SPIRONOLACTONE 25 MG/1
TAKE 1 TABLET (25 MG) BY ORAL ROUTE EVERY OTHER DAY TABLET ORAL
Qty: 0 | Refills: 0 | Status: ON HOLD | COMMUNITY
Start: 1900-01-01

## 2024-01-23 RX ORDER — INSULIN DEGLUDEC INJECTION 100 U/ML
AS DIRECTED INJECTION, SOLUTION SUBCUTANEOUS
Status: ACTIVE | COMMUNITY

## 2024-01-23 RX ORDER — TRAVOPROST OPHTHALMIC SOLUTION, 0.004% 0.04 MG/ML
1 DROP INTO AFFECTED EYE IN THE EVENING SOLUTION/ DROPS OPHTHALMIC ONCE A DAY
Status: ACTIVE | COMMUNITY

## 2024-01-23 RX ORDER — PIOGLITAZONE 30 MG/1
1 TABLET TABLET ORAL ONCE A DAY
Status: ACTIVE | COMMUNITY

## 2024-01-24 ENCOUNTER — APPOINTMENT (RX ONLY)
Dept: URBAN - NONMETROPOLITAN AREA OTHER 1 | Facility: OTHER | Age: 69
Setting detail: DERMATOLOGY
End: 2024-01-24

## 2024-01-24 DIAGNOSIS — L72.8 OTHER FOLLICULAR CYSTS OF THE SKIN AND SUBCUTANEOUS TISSUE: ICD-10-CM

## 2024-01-24 PROCEDURE — ? SUTURE REMOVAL (GLOBAL PERIOD)

## 2024-01-24 ASSESSMENT — LOCATION ZONE DERM: LOCATION ZONE: NECK

## 2024-01-24 ASSESSMENT — LOCATION DETAILED DESCRIPTION DERM: LOCATION DETAILED: RIGHT SUPERIOR POSTERIOR NECK

## 2024-01-24 ASSESSMENT — LOCATION SIMPLE DESCRIPTION DERM: LOCATION SIMPLE: POSTERIOR NECK

## 2024-01-24 NOTE — PROCEDURE: SUTURE REMOVAL (GLOBAL PERIOD)
Detail Level: Detailed
Add 27833 Cpt? (Important Note: In 2017 The Use Of 55249 Is Being Tracked By Cms To Determine Future Global Period Reimbursement For Global Periods): no

## 2025-04-20 PROBLEM — 773200006: Status: ACTIVE | Noted: 2025-04-20

## 2025-04-20 PROBLEM — 80515008: Status: ACTIVE | Noted: 2025-04-20

## 2025-04-20 PROBLEM — 71820002: Status: ACTIVE | Noted: 2025-04-20

## 2025-04-21 ENCOUNTER — OFFICE VISIT (OUTPATIENT)
Dept: URBAN - METROPOLITAN AREA CLINIC 78 | Facility: CLINIC | Age: 70
End: 2025-04-21
Payer: MEDICARE

## 2025-04-21 DIAGNOSIS — K59.01 CONSTIPATION: ICD-10-CM

## 2025-04-21 DIAGNOSIS — Z86.0101 H/O ADENOMATOUS POLYP OF COLON: ICD-10-CM

## 2025-04-21 DIAGNOSIS — R16.0 HEPATOMEGALY: ICD-10-CM

## 2025-04-21 DIAGNOSIS — R15.2 FECAL URGENCY: ICD-10-CM

## 2025-04-21 DIAGNOSIS — K22.70 BARRETT ESOPHAGUS: ICD-10-CM

## 2025-04-21 DIAGNOSIS — R13.19 ESOPHAGEAL DYSPHAGIA: ICD-10-CM

## 2025-04-21 DIAGNOSIS — K86.89 FATTY PANCREAS: ICD-10-CM

## 2025-04-21 DIAGNOSIS — R11.2 NAUSEA WITH VOMITING, UNSPECIFIED: ICD-10-CM

## 2025-04-21 PROBLEM — 40890009: Status: ACTIVE | Noted: 2025-04-21

## 2025-04-21 PROBLEM — 43364001: Status: ACTIVE | Noted: 2025-04-21

## 2025-04-21 PROCEDURE — 99214 OFFICE O/P EST MOD 30 MIN: CPT

## 2025-04-21 RX ORDER — ATORVASTATIN CALCIUM 40 MG/1
TAKE 1 TABLET (40 MG) BY ORAL ROUTE ONCE DAILY TABLET, FILM COATED ORAL 1
Qty: 0 | Refills: 0 | Status: ACTIVE | COMMUNITY
Start: 1900-01-01

## 2025-04-21 RX ORDER — PANTOPRAZOLE SODIUM 40 MG/1
TAKE 1 TABLET (40 MG) BY ORAL ROUTE ONCE DAILY TABLET, DELAYED RELEASE ORAL ONCE A DAY
Qty: 90 | Refills: 3 | Status: ACTIVE | COMMUNITY

## 2025-04-21 RX ORDER — PANTOPRAZOLE SODIUM 40 MG
TAKE 1 TABLET (40 MG) BY ORAL ROUTE TWICE DAILY 1/2 HOUR BEFORE BREAKFAST AND 1/2 HOUR BEFORE DINNER TABLET, DELAYED RELEASE (ENTERIC COATED) ORAL 1
Qty: 60 | Refills: 2 | Status: ACTIVE | COMMUNITY
Start: 2019-07-23

## 2025-04-21 RX ORDER — ONDANSETRON HYDROCHLORIDE 8 MG/1
1 TABLET TABLET, FILM COATED ORAL
Qty: 40 | Refills: 1 | OUTPATIENT
Start: 2025-04-20

## 2025-04-21 RX ORDER — POLYETHYLENE GLYCOL 3350, SODIUM SULFATE, POTASSIUM CHLORIDE, MAGNESIUM SULFATE, AND SODIUM CHLORIDE FOR ORAL SOLUTION 178.7-7.3G
AS DIRECTED KIT ORAL
Status: ACTIVE | COMMUNITY
Start: 2023-12-04

## 2025-04-21 RX ORDER — PIOGLITAZONE 30 MG/1
1 TABLET TABLET ORAL ONCE A DAY
Status: ACTIVE | COMMUNITY

## 2025-04-21 RX ORDER — GABAPENTIN 300 MG/1
TAKE 1 CAPSULE (300 MG) BY ORAL ROUTE 3 TIMES PER DAY CAPSULE ORAL
Qty: 0 | Refills: 0 | Status: ACTIVE | COMMUNITY
Start: 1900-01-01

## 2025-04-21 RX ORDER — LISINOPRIL 2.5 MG/1
TAKE 1 TABLET (2.5 MG) BY ORAL ROUTE ONCE DAILY TABLET ORAL 1
Qty: 0 | Refills: 0 | Status: ON HOLD | COMMUNITY
Start: 1900-01-01

## 2025-04-21 RX ORDER — SPIRONOLACTONE 25 MG/1
TAKE 1 TABLET (25 MG) BY ORAL ROUTE EVERY OTHER DAY TABLET ORAL
Qty: 0 | Refills: 0 | Status: ACTIVE | COMMUNITY
Start: 1900-01-01

## 2025-04-21 RX ORDER — INSULIN DEGLUDEC INJECTION 100 U/ML
AS DIRECTED INJECTION, SOLUTION SUBCUTANEOUS
Status: ACTIVE | COMMUNITY

## 2025-04-21 RX ORDER — EMPAGLIFLOZIN 25 MG/1
1 TABLET TABLET, FILM COATED ORAL ONCE A DAY
Status: ACTIVE | COMMUNITY

## 2025-04-21 RX ORDER — DICYCLOMINE HYDROCHLORIDE 10 MG/1
2 CAPSULES CAPSULE ORAL THREE TIMES A DAY
Qty: 180 | OUTPATIENT
Start: 2025-04-21

## 2025-04-21 RX ORDER — PROPAFENONE HYDROCHLORIDE 300 MG/1
TAKE 1 TABLET (300 MG) BY ORAL ROUTE EVERY 8 HOURS TABLET, COATED ORAL
Qty: 0 | Refills: 0 | Status: ACTIVE | COMMUNITY
Start: 1900-01-01

## 2025-04-21 RX ORDER — DAPAGLIFLOZIN 10 MG/1
TAKE 1 TABLET (10 MG) BY ORAL ROUTE ONCE DAILY IN THE MORNING TABLET, FILM COATED ORAL 1
Qty: 0 | Refills: 0 | Status: ACTIVE | COMMUNITY
Start: 1900-01-01

## 2025-04-21 RX ORDER — TRAVOPROST OPHTHALMIC SOLUTION, 0.004% 0.04 MG/ML
1 DROP INTO AFFECTED EYE IN THE EVENING SOLUTION/ DROPS OPHTHALMIC ONCE A DAY
Status: ACTIVE | COMMUNITY

## 2025-04-21 RX ORDER — INSULIN ASPART 100 [IU]/ML
AS DIRECTED INJECTION, SOLUTION INTRAVENOUS; SUBCUTANEOUS
Status: ACTIVE | COMMUNITY

## 2025-04-21 RX ORDER — MELOXICAM 15 MG/1
TAKE 1 TABLET (15 MG) BY ORAL ROUTE ONCE DAILY TABLET ORAL 1
Qty: 0 | Refills: 0 | Status: ACTIVE | COMMUNITY
Start: 1900-01-01

## 2025-04-21 RX ORDER — ASPIRIN 81 MG/1
TABLET, COATED ORAL
Qty: 0 | Refills: 0 | Status: ACTIVE | COMMUNITY
Start: 1900-01-01

## 2025-04-21 RX ORDER — ONDANSETRON HYDROCHLORIDE 8 MG/1
1 TABLET TABLET, FILM COATED ORAL
Qty: 40 | Refills: 1 | Status: ACTIVE | COMMUNITY

## 2025-06-11 ENCOUNTER — APPOINTMENT (OUTPATIENT)
Dept: URBAN - NONMETROPOLITAN AREA OTHER 1 | Facility: OTHER | Age: 70
Setting detail: DERMATOLOGY
End: 2025-06-11

## 2025-06-11 DIAGNOSIS — B07.8 OTHER VIRAL WARTS: ICD-10-CM

## 2025-06-11 DIAGNOSIS — L57.0 ACTINIC KERATOSIS: ICD-10-CM

## 2025-06-11 PROCEDURE — ? COUNSELING

## 2025-06-11 PROCEDURE — ? LIQUID NITROGEN

## 2025-06-11 ASSESSMENT — LOCATION ZONE DERM
LOCATION ZONE: EAR
LOCATION ZONE: FINGER

## 2025-06-11 ASSESSMENT — LOCATION DETAILED DESCRIPTION DERM
LOCATION DETAILED: LEFT DISTAL RADIAL PALMAR INDEX FINGER
LOCATION DETAILED: LEFT ANTITRAGUS

## 2025-06-11 ASSESSMENT — LOCATION SIMPLE DESCRIPTION DERM
LOCATION SIMPLE: LEFT EAR
LOCATION SIMPLE: LEFT INDEX FINGER

## 2025-07-14 ENCOUNTER — APPOINTMENT (OUTPATIENT)
Dept: URBAN - NONMETROPOLITAN AREA OTHER 1 | Facility: OTHER | Age: 70
Setting detail: DERMATOLOGY
End: 2025-07-14

## 2025-07-14 DIAGNOSIS — B07.8 OTHER VIRAL WARTS: ICD-10-CM

## 2025-07-14 DIAGNOSIS — H61.03 CHONDRITIS OF EXTERNAL EAR: ICD-10-CM

## 2025-07-14 PROBLEM — H61.032 CHONDRITIS OF LEFT EXTERNAL EAR: Status: ACTIVE | Noted: 2025-07-14

## 2025-07-14 PROCEDURE — ? COUNSELING

## 2025-07-14 PROCEDURE — ? LIQUID NITROGEN

## 2025-07-14 ASSESSMENT — LOCATION DETAILED DESCRIPTION DERM
LOCATION DETAILED: LEFT DISTAL RADIAL PALMAR INDEX FINGER
LOCATION DETAILED: LEFT ANTITRAGUS

## 2025-07-14 ASSESSMENT — LOCATION SIMPLE DESCRIPTION DERM
LOCATION SIMPLE: LEFT EAR
LOCATION SIMPLE: LEFT INDEX FINGER

## 2025-07-14 ASSESSMENT — LOCATION ZONE DERM
LOCATION ZONE: FINGER
LOCATION ZONE: EAR

## 2025-07-14 NOTE — PROCEDURE: LIQUID NITROGEN
Show Aperture Variable?: Yes
Detail Level: Detailed
Render Post-Care Instructions In Note?: no
Consent: The patient's consent was obtained including but not limited to risks of crusting, scabbing, blistering, scarring, darker or lighter pigmentary change, recurrence, incomplete removal and infection.
Application Tool (Optional): Liquid Nitrogen Sprayer
Post-Care Instructions: I reviewed with the patient in detail post-care instructions. Patient is to wear sunprotection, and avoid picking at any of the treated lesions. Pt may apply Vaseline to crusted or scabbing areas.
Spray Paint Text: The liquid nitrogen was applied to the skin utilizing a spray paint frosting technique.
Medical Necessity Clause: This procedure was medically necessary because the lesions that were treated were:
Number Of Freeze-Thaw Cycles: 3 freeze-thaw cycles
Duration Of Freeze Thaw-Cycle (Seconds): 5-10
Medical Necessity Information: It is in your best interest to select a reason for this procedure from the list below. All of these items fulfill various CMS LCD requirements except the new and changing color options.

## 2025-08-06 ENCOUNTER — APPOINTMENT (OUTPATIENT)
Dept: URBAN - NONMETROPOLITAN AREA OTHER 1 | Facility: OTHER | Age: 70
Setting detail: DERMATOLOGY
End: 2025-08-06

## 2025-08-06 DIAGNOSIS — B07.8 OTHER VIRAL WARTS: ICD-10-CM | Status: RESOLVING

## 2025-08-06 PROCEDURE — ? ADDITIONAL NOTES

## 2025-08-06 PROCEDURE — ? LIQUID NITROGEN

## 2025-08-06 PROCEDURE — ? COUNSELING

## 2025-08-06 ASSESSMENT — LOCATION SIMPLE DESCRIPTION DERM: LOCATION SIMPLE: LEFT INDEX FINGER

## 2025-08-06 ASSESSMENT — LOCATION DETAILED DESCRIPTION DERM: LOCATION DETAILED: LEFT DISTAL RADIAL PALMAR INDEX FINGER

## 2025-08-06 ASSESSMENT — LOCATION ZONE DERM: LOCATION ZONE: FINGER
